# Patient Record
Sex: FEMALE | HISPANIC OR LATINO | Employment: UNEMPLOYED | URBAN - METROPOLITAN AREA
[De-identification: names, ages, dates, MRNs, and addresses within clinical notes are randomized per-mention and may not be internally consistent; named-entity substitution may affect disease eponyms.]

---

## 2020-06-12 ENCOUNTER — HOSPITAL ENCOUNTER (EMERGENCY)
Facility: MEDICAL CENTER | Age: 30
End: 2020-06-12
Attending: EMERGENCY MEDICINE
Payer: MEDICAID

## 2020-06-12 VITALS
HEART RATE: 136 BPM | DIASTOLIC BLOOD PRESSURE: 102 MMHG | RESPIRATION RATE: 18 BRPM | WEIGHT: 99.65 LBS | BODY MASS INDEX: 18.34 KG/M2 | TEMPERATURE: 98.2 F | HEIGHT: 62 IN | SYSTOLIC BLOOD PRESSURE: 146 MMHG | OXYGEN SATURATION: 98 %

## 2020-06-12 DIAGNOSIS — R21 RASH: ICD-10-CM

## 2020-06-12 DIAGNOSIS — R00.0 TACHYCARDIA: Primary | ICD-10-CM

## 2020-06-12 LAB — EKG IMPRESSION: NORMAL

## 2020-06-12 PROCEDURE — 93005 ELECTROCARDIOGRAM TRACING: CPT

## 2020-06-12 PROCEDURE — 99284 EMERGENCY DEPT VISIT MOD MDM: CPT

## 2020-06-12 PROCEDURE — 93005 ELECTROCARDIOGRAM TRACING: CPT | Performed by: EMERGENCY MEDICINE

## 2020-06-12 ASSESSMENT — ENCOUNTER SYMPTOMS
ABDOMINAL PAIN: 0
SHORTNESS OF BREATH: 1
COUGH: 1
BACK PAIN: 0
DIZZINESS: 0
FEVER: 1
NERVOUS/ANXIOUS: 1
HEADACHES: 0
CHILLS: 1
NAUSEA: 1
SORE THROAT: 1
NECK PAIN: 0
VOMITING: 0

## 2020-06-13 NOTE — ED NOTES
Ambulatory to triage from EKG with multiple complaints   Chief Complaint   Patient presents with   • Rash   • Painful Urination   • Other   Poor historian with difficulty answering questions. States she has been seen at [facility] yesterday and today for Covid-19 rule out due to family members being positive, and high fever, rash, chills, loss of taste and smell, and body aches. States she was also found to have a UTI and abnormality on CT of the chest. States she is confused regarding directions for prescribed medications. Pt is agitated and refusing to answer questions. Placed in Y-62.

## 2020-06-13 NOTE — ED NOTES
ERP at bedside to assess pt, pt informed by erp that IV antibiotics will be necessary, pt refuses at this time. AMA paperwork signed.

## 2020-06-13 NOTE — ED PROVIDER NOTES
ED Provider Note   6/12/2020  7:46 PM    Means of Arrival: Walk In  History obtained by: patient      CHIEF COMPLAINT  Chief Complaint   Patient presents with   • Rash   • Painful Urination   • Other       HPI  Kina Waldrop is a 30 y.o. female presents with request for medication adjustments.   She provides a very scattered history of events over the last 2 to 3 days.  She says she has had for provider visits.  She is unable to tell me events in a linear order.  Immediately when I came into the room she said that she did not want to bother me with all the details and that she is just here for Rocephin. She provides minimal details and says she is very angry with having to come back to the doctor. After a prolonged discussion, over 20 minutes I am able to gather the following.  She went to an emergency department in [facility] 1 to 2 days ago.  When she went there she was having a cough, shortness of breath chest pain.  She was prescribed an inhaler, antihistamines, Zofran.  She was not prescribed any antibiotics.  She says they treated her for a URI and then forgot to treat her for UTI.  She says she is having urinary tract and symptoms.  She then developed a body rash and went back to the emergency department.  She was prescribed hydroxyzine, prednisone and Keflex.  I am able to gather the medications and diagnoses by discharge paperwork that were given to her today.  She says she is here now for different treatment of UTI.  She has not taken any antibiotics yet for UTI.  A very confusing discussion on what treatment she wants for the UTI.  I offered to obtain her medical records so I could see the results of the urinalysis, serum studies and she says she would not wait for me to obtain these results.  I was able to see on her paperwork that she had multiple serum studies done, CTA of her chest.  She says she was told that she does not have a pulmonary embolism but never really saw the results of the study.   "She is requesting Rocephin but declines IV.  A few weeks ago \"all of my family members\" had COVID-19.  She reports having a negative test at that time.  She has a pending COVID-19 test now that was done at previous emergency department.  History and limited due to her willingness to provide details.  She was very fixated on time and that she needed to leave to go home to her family, she did not want to stay for monitoring of her vital signs, review of outside records, or potential treatments which could include IV antibiotics.    Regarding rash, this started yesterday is diffuse small raised areas.  It itches.  Has improved today.  No facial swelling, lightheadedness.  No oral involvement of rash.      REVIEW OF SYSTEMS  Review of Systems   Constitutional: Positive for chills and fever.   HENT: Positive for congestion and sore throat.    Respiratory: Positive for cough and shortness of breath.    Cardiovascular: Negative for chest pain and leg swelling.   Gastrointestinal: Positive for nausea. Negative for abdominal pain and vomiting.   Genitourinary: Positive for dysuria and frequency.   Musculoskeletal: Negative for back pain and neck pain.   Skin: Positive for itching and rash.   Neurological: Negative for dizziness and headaches.   Psychiatric/Behavioral: The patient is nervous/anxious.    All other systems reviewed and are negative.    See HPI for further details.     PAST MEDICAL HISTORY   has a past medical history of ADD (attention deficit disorder) and Vitamin D deficiency.    SOCIAL HISTORY  Social History     Tobacco Use   • Smoking status: Former Smoker   • Smokeless tobacco: Never Used   Substance and Sexual Activity   • Alcohol use: Not Currently   • Drug use: Not Currently   • Sexual activity: Not on file       SURGICAL HISTORY  patient denies any surgical history    CURRENT MEDICATIONS  Home Medications     Reviewed by Daly Noyola R.N. (Registered Nurse) on 06/12/20 at 9452  Med List Status: " "Partial   Medication Last Dose Status   alprazolam (XANAX) 0.5 MG Tab  Active   amphetamine-dextroamphetamine (ADDERALL) 20 MG TABS 2020 Active   venlafaxine (EFFEXOR) 75 MG TABS  Active                ALLERGIES  No Known Allergies    PHYSICAL EXAM  VITAL SIGNS: /102   Pulse (!) 136   Temp 36.8 °C (98.2 °F) (Temporal)   Resp 18   Ht 1.575 m (5' 2\")   Wt 45.2 kg (99 lb 10.4 oz)   LMP 2020   SpO2 98%   BMI 18.23 kg/m²    Pulse ox interpretation: I interpret this pulse ox as normal.  Constitutional: Labile mood, sitting in chair fully clothed.  HENT: Normocephalic, Atraumatic, Bilateral external ears normal. Nose normal.   Eyes: Pupils are equal. Conjunctiva normal, non-icteric.   Heart: Increased rate.  Lungs: No respiratory distress, regular respirations.   Skin: Warm, Dry, No erythema, raised red lesions on abdomen.  Not blisters.  She was unwilling to show me on the other areas of rash.  Neurologic: Alert, Grossly non-focal. No slurred speech. Moving extremities normally.   MSK: Full range of extremities without limitations.  Psychiatric: Labile mood.  Physical Exam    Results for orders placed or performed during the hospital encounter of 20   EKG (NOW)   Result Value Ref Range    Report       [facility] Emergency Dept.    Test Date:  2020  Pt Name:    SAMIA ALVAREZ               Department: ER  MRN:        [id number]                      Room:  Gender:     Female                       Technician: [id number]  :        1990                   Requested By:ER TRIAGE PROTOCOL  Order #:    [id number]                    Reading MD: Franklin Chambers II, MD    Measurements  Intervals                                Axis  Rate:       129                          P:          78  MN:         116                          QRS:        71  QRSD:       76                           T:          58  QT:         296  QTc:        434    Interpretive Statements  SINUS " RHYTHM  RATE 129  NORMAL INTERVALS  NO ST ELEVATION  SINUS TACHYCARDIA  No previous ECG available for comparison  Electronically Signed On 6- 20:30:21 PDT by Franklin Chambers II, MD           COURSE & MEDICAL DECISION MAKING  Pertinent Labs & Imaging studies reviewed. (See chart for details)    7:46 PM This is an emergent evaluation of a 30 y.o., female who presents with request for antibiotics for treatment of UTI.  Very difficult interview.  See above for details.  Ultimately she requested to leave AGAINST MEDICAL ADVICE.  I explained to her multiple times that I have serious concerns about her high heart rate, her symptoms.  I specifically told her I am concerned she has sepsis and I believe she needs further evaluation here.  By definition she likely does have sepsis because of the high heart rate, reported UTI, reported fever at home.  Given respiratory symptoms this could also be COVID-19.  I wanted to review her outside medical records to see serum studies, urine studies, imaging studies to get a better idea of severity of her disease.  I was unwilling to give new medications without some objective data such as urinalysis, repeat vital signs before changing antibiotic treatment or giving IV antibiotics.  Cause of rash also unclear, if COVID-19 this could be a viral exanthem.  It could be a drug rash from the medications she was prescribed.  I did recommend that she can use the inhaler for cough, can take antibiotics (keflex) for UTI.  She is not demonstrating symptoms of anaphylaxis.  She was very fixated on the time course of the antibiotics prescribed for 7 days.  We spoke at length regarding this course that it is a normal course for antibiotics.  She then mentioned that she went 3 days without treatment and thought that the antibiotic course to be adjusted to this.  However with limited information I was not able to give her an explanation regarding why she went 3 days without treatment, and if  there should be changes to her medications.  She was unwilling to accept that her rapid heart rate could be due to an infection or other underlying serious medical condition.  She reasoned that it was only because she is angry.  Despite having a very scattered history from her I do believe she can make her own decisions regarding leaving the emergency department AGAINST MEDICAL ADVICE.  She was alert, oriented, no neurologic deficits.  I do not feel that her inability to provide me with a linear history was grounds for holding her here against her will.  She is able to verbalize her understanding that untreated or undertreated disease could result in permanent disability and even death.  She plans to fill the prescription of antibiotics and start taking them tonight.  She later said that she came because she did not have access to her usual pharmacy.  I did inform her of the pharmacies that are open at this time that she could fill the prescription at.  I was even willing to give her a dose of the prescribed oral antibiotic here.  However she declined and insisted on leaving the emergency department.  I did everything I could to try to convince her to stay for further evaluation but she continued to decline any further medical screening.       I eventually received records from [facility] and reviewed them.  ER records from June 11, 2020 and June 12.  Urinalysis was nitrite positive with 2+ blood many bacteria and over 100 white cells.  Influenza negative.  Potassium 2.9.  High CRP at 17.  Elevated d-dimer 1.81.  Lactic acid 2.1.  Chest x-ray normal.  White blood cell count 8.7.  Procalcitonin elevated.  Pregnancy negative.  Low absolute lymphocyte count.  CTA of the chest showed no evidence of pulmonary emboli.  No cardiopulmonary abnormalities.  I only received results, there was no provider narrative.      FINAL IMPRESSION  1. Tachycardia    2. Rash               Electronically signed by: Franklin Chambers II,  M.D., 6/12/2020 7:46 PM

## 2020-06-17 ENCOUNTER — HOSPITAL ENCOUNTER (EMERGENCY)
Dept: HOSPITAL 8 - ED | Age: 30
Discharge: HOME | End: 2020-06-17
Payer: MEDICAID

## 2020-06-17 VITALS — BODY MASS INDEX: 19.38 KG/M2 | WEIGHT: 109.35 LBS | HEIGHT: 63 IN

## 2020-06-17 VITALS — SYSTOLIC BLOOD PRESSURE: 124 MMHG | DIASTOLIC BLOOD PRESSURE: 83 MMHG

## 2020-06-17 DIAGNOSIS — R07.89: ICD-10-CM

## 2020-06-17 DIAGNOSIS — M79.89: ICD-10-CM

## 2020-06-17 DIAGNOSIS — R06.02: ICD-10-CM

## 2020-06-17 DIAGNOSIS — K21.0: Primary | ICD-10-CM

## 2020-06-17 LAB
ALBUMIN SERPL-MCNC: 3.2 G/DL (ref 3.4–5)
ALP SERPL-CCNC: 91 U/L (ref 45–117)
ALT SERPL-CCNC: 26 U/L (ref 12–78)
ANION GAP SERPL CALC-SCNC: 4 MMOL/L (ref 5–15)
BASOPHILS # BLD AUTO: 0.04 X10^3/UL (ref 0–0.1)
BASOPHILS NFR BLD AUTO: 1 % (ref 0–1)
BILIRUB SERPL-MCNC: 0.3 MG/DL (ref 0.2–1)
CALCIUM SERPL-MCNC: 8.8 MG/DL (ref 8.5–10.1)
CHLORIDE SERPL-SCNC: 106 MMOL/L (ref 98–107)
CREAT SERPL-MCNC: 0.79 MG/DL (ref 0.55–1.02)
EOSINOPHIL # BLD AUTO: 0.17 X10^3/UL (ref 0–0.4)
EOSINOPHIL NFR BLD AUTO: 2 % (ref 1–7)
ERYTHROCYTE [DISTWIDTH] IN BLOOD BY AUTOMATED COUNT: 13.4 % (ref 9.6–15.2)
LYMPHOCYTES # BLD AUTO: 1.26 X10^3/UL (ref 1–3.4)
LYMPHOCYTES NFR BLD AUTO: 15 % (ref 22–44)
MCH RBC QN AUTO: 29.9 PG (ref 27–34.8)
MCHC RBC AUTO-ENTMCNC: 33 G/DL (ref 32.4–35.8)
MCV RBC AUTO: 90.6 FL (ref 80–100)
MD: NO
MONOCYTES # BLD AUTO: 0.37 X10^3/UL (ref 0.2–0.8)
MONOCYTES NFR BLD AUTO: 4 % (ref 2–9)
NEUTROPHILS # BLD AUTO: 6.55 X10^3/UL (ref 1.8–6.8)
NEUTROPHILS NFR BLD AUTO: 78 % (ref 42–75)
PLATELET # BLD AUTO: 493 X10^3/UL (ref 130–400)
PMV BLD AUTO: 8.6 FL (ref 7.4–10.4)
PROT SERPL-MCNC: 7.3 G/DL (ref 6.4–8.2)
RBC # BLD AUTO: 4.15 X10^6/UL (ref 3.82–5.3)
TROPONIN I SERPL-MCNC: < 0.015 NG/ML (ref 0–0.04)

## 2020-06-17 PROCEDURE — 93005 ELECTROCARDIOGRAM TRACING: CPT

## 2020-06-17 PROCEDURE — 80053 COMPREHEN METABOLIC PANEL: CPT

## 2020-06-17 PROCEDURE — 85025 COMPLETE CBC W/AUTO DIFF WBC: CPT

## 2020-06-17 PROCEDURE — 99285 EMERGENCY DEPT VISIT HI MDM: CPT

## 2020-06-17 PROCEDURE — 36415 COLL VENOUS BLD VENIPUNCTURE: CPT

## 2020-06-17 PROCEDURE — 71045 X-RAY EXAM CHEST 1 VIEW: CPT

## 2020-06-17 PROCEDURE — 84484 ASSAY OF TROPONIN QUANT: CPT

## 2020-07-03 ENCOUNTER — HOSPITAL ENCOUNTER (EMERGENCY)
Dept: HOSPITAL 8 - ED | Age: 30
Discharge: LEFT BEFORE DISCHARGE | End: 2020-07-03
Payer: MEDICAID

## 2020-07-03 VITALS — DIASTOLIC BLOOD PRESSURE: 55 MMHG | SYSTOLIC BLOOD PRESSURE: 135 MMHG

## 2020-07-03 VITALS — WEIGHT: 108.69 LBS | HEIGHT: 62 IN | BODY MASS INDEX: 20 KG/M2

## 2020-07-03 DIAGNOSIS — Z53.21: ICD-10-CM

## 2020-07-03 DIAGNOSIS — R00.0: ICD-10-CM

## 2020-07-03 DIAGNOSIS — R06.02: Primary | ICD-10-CM

## 2020-07-03 PROCEDURE — 93005 ELECTROCARDIOGRAM TRACING: CPT

## 2020-07-29 ENCOUNTER — TELEPHONE (OUTPATIENT)
Dept: SCHEDULING | Facility: IMAGING CENTER | Age: 30
End: 2020-07-29

## 2022-06-04 ENCOUNTER — HOSPITAL ENCOUNTER (EMERGENCY)
Facility: MEDICAL CENTER | Age: 32
End: 2022-06-04
Attending: EMERGENCY MEDICINE
Payer: MEDICAID

## 2022-06-04 VITALS
HEART RATE: 72 BPM | SYSTOLIC BLOOD PRESSURE: 116 MMHG | HEIGHT: 62 IN | WEIGHT: 148.37 LBS | OXYGEN SATURATION: 99 % | DIASTOLIC BLOOD PRESSURE: 77 MMHG | RESPIRATION RATE: 18 BRPM | TEMPERATURE: 98.5 F | BODY MASS INDEX: 27.3 KG/M2

## 2022-06-04 DIAGNOSIS — J10.1 INFLUENZA A: ICD-10-CM

## 2022-06-04 LAB
APPEARANCE UR: CLEAR
BILIRUB UR QL STRIP.AUTO: NEGATIVE
COLOR UR: YELLOW
FLUAV RNA SPEC QL NAA+PROBE: POSITIVE
FLUBV RNA SPEC QL NAA+PROBE: NEGATIVE
GLUCOSE UR STRIP.AUTO-MCNC: NEGATIVE MG/DL
KETONES UR STRIP.AUTO-MCNC: NEGATIVE MG/DL
LEUKOCYTE ESTERASE UR QL STRIP.AUTO: NEGATIVE
MICRO URNS: NORMAL
NITRITE UR QL STRIP.AUTO: NEGATIVE
PH UR STRIP.AUTO: 6 [PH] (ref 5–8)
PROT UR QL STRIP: NEGATIVE MG/DL
RBC UR QL AUTO: NEGATIVE
RSV RNA SPEC QL NAA+PROBE: NEGATIVE
S PYO DNA SPEC NAA+PROBE: NOT DETECTED
SARS-COV-2 RNA RESP QL NAA+PROBE: NOTDETECTED
SP GR UR STRIP.AUTO: 1.02
SPECIMEN SOURCE: ABNORMAL

## 2022-06-04 PROCEDURE — 81003 URINALYSIS AUTO W/O SCOPE: CPT

## 2022-06-04 PROCEDURE — 87651 STREP A DNA AMP PROBE: CPT

## 2022-06-04 PROCEDURE — 0241U HCHG SARS-COV-2 COVID-19 NFCT DS RESP RNA 4 TRGT MIC: CPT

## 2022-06-04 PROCEDURE — C9803 HOPD COVID-19 SPEC COLLECT: HCPCS | Performed by: EMERGENCY MEDICINE

## 2022-06-04 PROCEDURE — 99283 EMERGENCY DEPT VISIT LOW MDM: CPT

## 2022-06-05 NOTE — DISCHARGE INSTRUCTIONS
Your flu test was positive.  Strep test, urinalysis and COVID test were negative.  Rest, drink plenty of fluids.  Continue ibuprofen or Tylenol for aches and pains.  Return for worsening symptoms or other concerns.

## 2022-06-05 NOTE — ED NOTES
Pt discharged with instructions and script.  Parents verbalized understanding of discharge instructions.  Pt ambulated out of ED with parents

## 2022-06-05 NOTE — ED PROVIDER NOTES
"ED Provider Note    CHIEF COMPLAINT  Chief Complaint   Patient presents with   • Fever     Reports fever, h/a, cough, nausea. Took home COVID test which was negative. Pt. S/O with similar sx per pt.        HPI  Kina Waldrop is a 32 y.o. female who presents to the emergency department complaining of fever.  Patient's had fever cough and cold symptoms since Thursday.  She has had some cough, congestion, achiness, headache and fever.  She had a negative home COVID test.  She is here with her fiancé who has similar symptoms.  She also has associated dysuria.  She had 1 episode of vomiting today along with some nausea but no diarrhea.  She denies any abdominal pain denies pregnancy.  Denies any other aggravating alleviating factors or associated complaints.    REVIEW OF SYSTEMS  See HPI for further details. All other systems are negative.    PAST MEDICAL HISTORY  Past Medical History:   Diagnosis Date   • ADD (attention deficit disorder)    • Vitamin D deficiency        FAMILY HISTORY  No family history on file.    SOCIAL HISTORY  Social History     Socioeconomic History   • Marital status: Single   Tobacco Use   • Smoking status: Former Smoker   • Smokeless tobacco: Never Used   Vaping Use   • Vaping Use: Former   Substance and Sexual Activity   • Alcohol use: Not Currently   • Drug use: Not Currently       SURGICAL HISTORY  History reviewed. No pertinent surgical history.    CURRENT MEDICATIONS  Home Medications    **Home medications have not yet been reviewed for this encounter**         ALLERGIES  No Known Allergies    PHYSICAL EXAM  VITAL SIGNS: /76   Pulse 84   Temp 37.1 °C (98.7 °F) (Temporal)   Resp 18   Ht 1.575 m (5' 2\")   Wt 67.3 kg (148 lb 5.9 oz)   SpO2 96%   BMI 27.14 kg/m²  *  Constitutional: Well developed, Well nourished, No acute distress, Non-toxic appearance.   HENT: Normocephalic, Atraumatic, Bilateral external ears normal, Oropharynx moist, No oral exudates, Nose swollen mucosa with " clear rhinorrhea.  Eyes: PERRL, EOMI, Conjunctiva normal, No discharge.   Neck: Normal range of motion, No tenderness, Supple, No stridor.   Cardiovascular: Normal heart rate, Normal rhythm, No murmurs, No rubs, No gallops.   Thorax & Lungs: Normal breath sounds, No respiratory distress, No wheezing,  Abdomen: Bowel sounds normal, Soft, No tenderness,  Skin: Warm, Dry, No erythema, No rash.   Back: No tenderness, No CVA tenderness.   Musculoskeletal: Good range of motion in all major joints.  Neurologic: Alert, No focal deficits noted.   Psychiatric: Affect normal        Results for orders placed or performed during the hospital encounter of 06/04/22   Group A Strep by PCR    Specimen: Throat   Result Value Ref Range    Group A Strep by PCR Not Detected Not Detected   CoV-2, FLU A/B, and RSV by PCR (2-4 Hours CEPHEID) : Collect NP swab in VTM    Specimen: Respirate   Result Value Ref Range    Influenza virus A RNA POSITIVE (A) Negative    Influenza virus B, PCR Negative Negative    RSV, PCR Negative Negative    SARS-CoV-2 by PCR NotDetected     SARS-CoV-2 Source Nasal Swab    URINALYSIS,CULTURE IF INDICATED    Specimen: Respirate   Result Value Ref Range    Color Yellow     Character Clear     Specific Gravity 1.025 <1.035    Ph 6.0 5.0 - 8.0    Glucose Negative Negative mg/dL    Ketones Negative Negative mg/dL    Protein Negative Negative mg/dL    Bilirubin Negative Negative    Nitrite Negative Negative    Leukocyte Esterase Negative Negative    Occult Blood Negative Negative    Micro Urine Req see below       COURSE & MEDICAL DECISION MAKING  Pertinent Labs & Imaging studies reviewed. (See chart for details)      Patient presents with fever, achiness, dysuria, cough, and diffuse symptoms.  Per report family was here with a very sore throat.  Strep test is negative.  No indication antibiotics urinalysis is also negative.  I did a COVID and flu test this came back at about the same time as the other tests.  She is  influenza positive.    The patient has clear lungs she is not hypoxemic.  Do not hear any abnormal lung sounds I do not think she has pneumonia.    I do not think the patient requires hospitalization for any further work-up and treatment.  I do not think that blood tests or imaging is indicated.  She be discharged home with return precautions for influenza.  She will return for worsening cough, shortness of breath or other concerns.  Otherwise recommend ibuprofen or Tylenol for discomfort, typical influenza remedies and follow-up and return precautions.  Questions are answered she is agreeable to plan and discharged in good condition.    Ismael Nagy M.D.  [address] [place] [address] [id number]  [id number]  [facility] [id number]  [phone]              FINAL IMPRESSION  1. Influenza A         2.   3.         Electronically signed by: Zain Zuniga M.D., 6/4/2022 9:15 PM